# Patient Record
Sex: FEMALE | Race: ASIAN | URBAN - METROPOLITAN AREA
[De-identification: names, ages, dates, MRNs, and addresses within clinical notes are randomized per-mention and may not be internally consistent; named-entity substitution may affect disease eponyms.]

---

## 2023-11-18 ENCOUNTER — EMERGENCY (EMERGENCY)
Facility: HOSPITAL | Age: 78
LOS: 1 days | Discharge: HOME | End: 2023-11-18
Attending: STUDENT IN AN ORGANIZED HEALTH CARE EDUCATION/TRAINING PROGRAM | Admitting: STUDENT IN AN ORGANIZED HEALTH CARE EDUCATION/TRAINING PROGRAM
Payer: MEDICARE

## 2023-11-18 VITALS
RESPIRATION RATE: 17 BRPM | TEMPERATURE: 98 F | OXYGEN SATURATION: 98 % | DIASTOLIC BLOOD PRESSURE: 84 MMHG | HEART RATE: 81 BPM | SYSTOLIC BLOOD PRESSURE: 117 MMHG

## 2023-11-18 VITALS
SYSTOLIC BLOOD PRESSURE: 177 MMHG | OXYGEN SATURATION: 99 % | HEART RATE: 72 BPM | RESPIRATION RATE: 16 BRPM | DIASTOLIC BLOOD PRESSURE: 85 MMHG

## 2023-11-18 LAB
ALBUMIN SERPL ELPH-MCNC: 4.1 G/DL — SIGNIFICANT CHANGE UP (ref 3.3–5)
ALBUMIN SERPL ELPH-MCNC: 4.1 G/DL — SIGNIFICANT CHANGE UP (ref 3.3–5)
ALP SERPL-CCNC: 83 U/L — SIGNIFICANT CHANGE UP (ref 40–120)
ALP SERPL-CCNC: 83 U/L — SIGNIFICANT CHANGE UP (ref 40–120)
ALT FLD-CCNC: 18 U/L — SIGNIFICANT CHANGE UP (ref 4–33)
ALT FLD-CCNC: 18 U/L — SIGNIFICANT CHANGE UP (ref 4–33)
ANION GAP SERPL CALC-SCNC: 9 MMOL/L — SIGNIFICANT CHANGE UP (ref 7–14)
ANION GAP SERPL CALC-SCNC: 9 MMOL/L — SIGNIFICANT CHANGE UP (ref 7–14)
AST SERPL-CCNC: 19 U/L — SIGNIFICANT CHANGE UP (ref 4–32)
AST SERPL-CCNC: 19 U/L — SIGNIFICANT CHANGE UP (ref 4–32)
BASOPHILS # BLD AUTO: 0.03 K/UL — SIGNIFICANT CHANGE UP (ref 0–0.2)
BASOPHILS # BLD AUTO: 0.03 K/UL — SIGNIFICANT CHANGE UP (ref 0–0.2)
BASOPHILS NFR BLD AUTO: 0.6 % — SIGNIFICANT CHANGE UP (ref 0–2)
BASOPHILS NFR BLD AUTO: 0.6 % — SIGNIFICANT CHANGE UP (ref 0–2)
BILIRUB SERPL-MCNC: 0.2 MG/DL — SIGNIFICANT CHANGE UP (ref 0.2–1.2)
BILIRUB SERPL-MCNC: 0.2 MG/DL — SIGNIFICANT CHANGE UP (ref 0.2–1.2)
BUN SERPL-MCNC: 16 MG/DL — SIGNIFICANT CHANGE UP (ref 7–23)
BUN SERPL-MCNC: 16 MG/DL — SIGNIFICANT CHANGE UP (ref 7–23)
CALCIUM SERPL-MCNC: 9.3 MG/DL — SIGNIFICANT CHANGE UP (ref 8.4–10.5)
CALCIUM SERPL-MCNC: 9.3 MG/DL — SIGNIFICANT CHANGE UP (ref 8.4–10.5)
CHLORIDE SERPL-SCNC: 101 MMOL/L — SIGNIFICANT CHANGE UP (ref 98–107)
CHLORIDE SERPL-SCNC: 101 MMOL/L — SIGNIFICANT CHANGE UP (ref 98–107)
CO2 SERPL-SCNC: 29 MMOL/L — SIGNIFICANT CHANGE UP (ref 22–31)
CO2 SERPL-SCNC: 29 MMOL/L — SIGNIFICANT CHANGE UP (ref 22–31)
CREAT SERPL-MCNC: 0.57 MG/DL — SIGNIFICANT CHANGE UP (ref 0.5–1.3)
CREAT SERPL-MCNC: 0.57 MG/DL — SIGNIFICANT CHANGE UP (ref 0.5–1.3)
EGFR: 93 ML/MIN/1.73M2 — SIGNIFICANT CHANGE UP
EGFR: 93 ML/MIN/1.73M2 — SIGNIFICANT CHANGE UP
EOSINOPHIL # BLD AUTO: 0.07 K/UL — SIGNIFICANT CHANGE UP (ref 0–0.5)
EOSINOPHIL # BLD AUTO: 0.07 K/UL — SIGNIFICANT CHANGE UP (ref 0–0.5)
EOSINOPHIL NFR BLD AUTO: 1.5 % — SIGNIFICANT CHANGE UP (ref 0–6)
EOSINOPHIL NFR BLD AUTO: 1.5 % — SIGNIFICANT CHANGE UP (ref 0–6)
GLUCOSE SERPL-MCNC: 113 MG/DL — HIGH (ref 70–99)
GLUCOSE SERPL-MCNC: 113 MG/DL — HIGH (ref 70–99)
HCT VFR BLD CALC: 37.3 % — SIGNIFICANT CHANGE UP (ref 34.5–45)
HCT VFR BLD CALC: 37.3 % — SIGNIFICANT CHANGE UP (ref 34.5–45)
HGB BLD-MCNC: 12.2 G/DL — SIGNIFICANT CHANGE UP (ref 11.5–15.5)
HGB BLD-MCNC: 12.2 G/DL — SIGNIFICANT CHANGE UP (ref 11.5–15.5)
IANC: 2.11 K/UL — SIGNIFICANT CHANGE UP (ref 1.8–7.4)
IANC: 2.11 K/UL — SIGNIFICANT CHANGE UP (ref 1.8–7.4)
IMM GRANULOCYTES NFR BLD AUTO: 0.2 % — SIGNIFICANT CHANGE UP (ref 0–0.9)
IMM GRANULOCYTES NFR BLD AUTO: 0.2 % — SIGNIFICANT CHANGE UP (ref 0–0.9)
LYMPHOCYTES # BLD AUTO: 2.15 K/UL — SIGNIFICANT CHANGE UP (ref 1–3.3)
LYMPHOCYTES # BLD AUTO: 2.15 K/UL — SIGNIFICANT CHANGE UP (ref 1–3.3)
LYMPHOCYTES # BLD AUTO: 44.9 % — HIGH (ref 13–44)
LYMPHOCYTES # BLD AUTO: 44.9 % — HIGH (ref 13–44)
MAGNESIUM SERPL-MCNC: 2.3 MG/DL — SIGNIFICANT CHANGE UP (ref 1.6–2.6)
MAGNESIUM SERPL-MCNC: 2.3 MG/DL — SIGNIFICANT CHANGE UP (ref 1.6–2.6)
MCHC RBC-ENTMCNC: 30.7 PG — SIGNIFICANT CHANGE UP (ref 27–34)
MCHC RBC-ENTMCNC: 30.7 PG — SIGNIFICANT CHANGE UP (ref 27–34)
MCHC RBC-ENTMCNC: 32.7 GM/DL — SIGNIFICANT CHANGE UP (ref 32–36)
MCHC RBC-ENTMCNC: 32.7 GM/DL — SIGNIFICANT CHANGE UP (ref 32–36)
MCV RBC AUTO: 93.7 FL — SIGNIFICANT CHANGE UP (ref 80–100)
MCV RBC AUTO: 93.7 FL — SIGNIFICANT CHANGE UP (ref 80–100)
MONOCYTES # BLD AUTO: 0.42 K/UL — SIGNIFICANT CHANGE UP (ref 0–0.9)
MONOCYTES # BLD AUTO: 0.42 K/UL — SIGNIFICANT CHANGE UP (ref 0–0.9)
MONOCYTES NFR BLD AUTO: 8.8 % — SIGNIFICANT CHANGE UP (ref 2–14)
MONOCYTES NFR BLD AUTO: 8.8 % — SIGNIFICANT CHANGE UP (ref 2–14)
NEUTROPHILS # BLD AUTO: 2.11 K/UL — SIGNIFICANT CHANGE UP (ref 1.8–7.4)
NEUTROPHILS # BLD AUTO: 2.11 K/UL — SIGNIFICANT CHANGE UP (ref 1.8–7.4)
NEUTROPHILS NFR BLD AUTO: 44 % — SIGNIFICANT CHANGE UP (ref 43–77)
NEUTROPHILS NFR BLD AUTO: 44 % — SIGNIFICANT CHANGE UP (ref 43–77)
NRBC # BLD: 0 /100 WBCS — SIGNIFICANT CHANGE UP (ref 0–0)
NRBC # BLD: 0 /100 WBCS — SIGNIFICANT CHANGE UP (ref 0–0)
NRBC # FLD: 0 K/UL — SIGNIFICANT CHANGE UP (ref 0–0)
NRBC # FLD: 0 K/UL — SIGNIFICANT CHANGE UP (ref 0–0)
NT-PROBNP SERPL-SCNC: 432 PG/ML — HIGH
NT-PROBNP SERPL-SCNC: 432 PG/ML — HIGH
PHOSPHATE SERPL-MCNC: 4.2 MG/DL — SIGNIFICANT CHANGE UP (ref 2.5–4.5)
PHOSPHATE SERPL-MCNC: 4.2 MG/DL — SIGNIFICANT CHANGE UP (ref 2.5–4.5)
PLATELET # BLD AUTO: 293 K/UL — SIGNIFICANT CHANGE UP (ref 150–400)
PLATELET # BLD AUTO: 293 K/UL — SIGNIFICANT CHANGE UP (ref 150–400)
POTASSIUM SERPL-MCNC: 3.6 MMOL/L — SIGNIFICANT CHANGE UP (ref 3.5–5.3)
POTASSIUM SERPL-MCNC: 3.6 MMOL/L — SIGNIFICANT CHANGE UP (ref 3.5–5.3)
POTASSIUM SERPL-SCNC: 3.6 MMOL/L — SIGNIFICANT CHANGE UP (ref 3.5–5.3)
POTASSIUM SERPL-SCNC: 3.6 MMOL/L — SIGNIFICANT CHANGE UP (ref 3.5–5.3)
PROT SERPL-MCNC: 7 G/DL — SIGNIFICANT CHANGE UP (ref 6–8.3)
PROT SERPL-MCNC: 7 G/DL — SIGNIFICANT CHANGE UP (ref 6–8.3)
RBC # BLD: 3.98 M/UL — SIGNIFICANT CHANGE UP (ref 3.8–5.2)
RBC # BLD: 3.98 M/UL — SIGNIFICANT CHANGE UP (ref 3.8–5.2)
RBC # FLD: 12.6 % — SIGNIFICANT CHANGE UP (ref 10.3–14.5)
RBC # FLD: 12.6 % — SIGNIFICANT CHANGE UP (ref 10.3–14.5)
SODIUM SERPL-SCNC: 139 MMOL/L — SIGNIFICANT CHANGE UP (ref 135–145)
SODIUM SERPL-SCNC: 139 MMOL/L — SIGNIFICANT CHANGE UP (ref 135–145)
TROPONIN T, HIGH SENSITIVITY RESULT: 13 NG/L — SIGNIFICANT CHANGE UP
TROPONIN T, HIGH SENSITIVITY RESULT: 13 NG/L — SIGNIFICANT CHANGE UP
WBC # BLD: 4.79 K/UL — SIGNIFICANT CHANGE UP (ref 3.8–10.5)
WBC # BLD: 4.79 K/UL — SIGNIFICANT CHANGE UP (ref 3.8–10.5)
WBC # FLD AUTO: 4.79 K/UL — SIGNIFICANT CHANGE UP (ref 3.8–10.5)
WBC # FLD AUTO: 4.79 K/UL — SIGNIFICANT CHANGE UP (ref 3.8–10.5)

## 2023-11-18 PROCEDURE — 99285 EMERGENCY DEPT VISIT HI MDM: CPT

## 2023-11-18 PROCEDURE — 93010 ELECTROCARDIOGRAM REPORT: CPT

## 2023-11-18 PROCEDURE — 71046 X-RAY EXAM CHEST 2 VIEWS: CPT | Mod: 26

## 2023-11-18 NOTE — ED PROVIDER NOTE - NSFOLLOWUPINSTRUCTIONS_ED_ALL_ED_FT
It was a pleasure caring for you today!    You were seen in the ER today for fatigue and palpitations. Your chest x ray and ekg was negative.     Please follow up with a cardiologist and your primary care doctor within 1 - 3 days. Call and let them know you were seen in the ER today.   Bring the results of your blood work and imaging with you to your appointment, if applicable.    For pain, please take acetaminophen 650 mg every 6 hours for pain. Additionally, you can also take ibuprofen 400 mg every 6-8 hours for pain.    Return to the ER for any worsening symptoms or concerns, including chest pain, shortness of breath, lightheadedness, weakness, or any other concerns. It was a pleasure caring for you today!    You were seen in the ER today for fatigue and palpitations. Your chest x ray and ekg was negative. Your blood pressure is elevated.     Please follow up with a cardiologist and your primary care doctor within 1 - 3 days. Call and let them know you were seen in the ER today.   Bring the results of your blood work and imaging with you to your appointment, if applicable.    For pain, please take acetaminophen 650 mg every 6 hours for pain. Additionally, you can also take ibuprofen 400 mg every 6-8 hours for pain.    Return to the ER for any worsening symptoms or concerns, including chest pain, shortness of breath, lightheadedness, weakness, or any other concerns.

## 2023-11-18 NOTE — ED ADULT NURSE NOTE - NSFALLUNIVINTERV_ED_ALL_ED
Bed/Stretcher in lowest position, wheels locked, appropriate side rails in place/Call bell, personal items and telephone in reach/Instruct patient to call for assistance before getting out of bed/chair/stretcher/Non-slip footwear applied when patient is off stretcher/[place] to call system/Physically safe environment - no spills, clutter or unnecessary equipment/Purposeful proactive rounding/Room/bathroom lighting operational, light cord in reach

## 2023-11-18 NOTE — ED PROVIDER NOTE - ATTENDING CONTRIBUTION TO CARE
82 yo F with h/o HTN who presents to the ED, referred by urgent care for "EKG changes."  Patient states that the last 5 years she has had intermittent palpitations, many years ago had an echo and stress test with cardiologist which she reports was normal at that time.  Went to urgent care today due to the palpitations as well as feeling fatigued. Had a covid test which was negative but also had an EKG which they told here was abnormal. Pt recently moved here and the daughter reports has been more active with the move which is probably why she is fatigued. On exam, pt well appearing, heart and lungs sounds normal. She denies chest pain and shortness of breath. EKG with T wave inversions- no prior for comparison. In the process of setting up care with a cardiologist in [place]. Plan for  labs, chest x-ray, reassess. If work-up normal, most likely d/c home with outpatient work-up

## 2023-11-18 NOTE — ED PROVIDER NOTE - CLINICAL SUMMARY MEDICAL DECISION MAKING FREE TEXT BOX
78-year-old female with no significant past medical history presents to the ED with fatigue and palpitations.  Patient endorses fatigue and stress ever since moving into her home.  Patient endorses dyspnea on exertion when moving furniture.  Patient denies any chest pain, shortness of breath, N/V, belly pain.  Patient went to urgent care and was told that she had an abnormal EKG.  Patient was told to come to the emergency department.    Vitals WNL.  Mild swelling noted on lower extremities.  R/L ACS, arrhythmia, CHF.  Orders include EKG, labs, chest x ray.  Dispo pending labs, imaging and reassessment.

## 2023-11-18 NOTE — ED ADULT TRIAGE NOTE - CHIEF COMPLAINT QUOTE
[facility] palpations[facility] to [facility] an [facility]. pt currently judy chest pain. pt c/o palpations, went to [facility] for a covid test and found to have an abnormal ekg. pt currently judy chest pain.  addendum: pt up-triaged, EKG abnormal.

## 2023-11-18 NOTE — ED PROVIDER NOTE - PATIENT PORTAL LINK FT
You can access the FollowMyHealth Patient Portal offered by [facility] by registering at the following website: [email or web address]. By joining [employer]’s FollowMyHealth portal, you will also be able to view your health information using other applications (apps) compatible with our system.

## 2023-11-18 NOTE — ED ADULT NURSE NOTE - OBJECTIVE STATEMENT
Pt reports being sent in from urgent care due to palpitations and abnormal ekg. Pt Denies chest pain, sob, cough, nausea, vomiting, and diarrhea. Pt place on continuos cardiac monitor with sinus rhythm, labs drawn, IV established.

## 2023-11-18 NOTE — ED ADULT NURSE NOTE - CHIEF COMPLAINT QUOTE
pt c/o palpations, went to [facility] for a covid test and found to have an abnormal ekg. pt currently judy chest pain.  addendum: pt up-triaged, EKG abnormal.

## 2023-11-18 NOTE — ED PROVIDER NOTE - PROGRESS NOTE DETAILS
BRADEN Moore - 81-year-old woman with PMH HTN, presenting due to EKG changes at urgent care.  Patient states that the last 5 years she has had intermittent palpitations, many years ago had an echo and stress test with cardiologist which were nonactionable.  Went to urgent care today due to the palpitations as well as feeling fatigued, daughter believes it may be due to doing a lot of chores since she has recently moved from [place]. COVID negative. EKG shows T wave inversions. No chest pain, no SOB.  No palpitations on cardiac monitor.  Labs, chest x-ray, reassess. foreseeable discharge. Pt reassessed at bedside and informed of negative results. Vitals are wnl.

## 2023-11-18 NOTE — ED PROVIDER NOTE - OBJECTIVE STATEMENT
78-year-old female with no significant past medical history presents to the ED with fatigue and palpitations.  Patient endorses fatigue and stress ever since moving into her home.  Patient endorses dyspnea on exertion when moving furniture.  Patient denies any chest pain, shortness of breath, N/V, belly pain.  Patient went to urgent care and was told that she had an abnormal EKG.  Patient was told to come to the emergency department.

## 2023-11-18 NOTE — ED PROVIDER NOTE - PHYSICAL EXAMINATION
Const: Awake, alert, no acute distress.  Well appearing.  Moving comfortably on stretcher.  HEENT: NC/AT.  Moist mucous membranes.  No pharyngeal erythema, no exudates.  Cardiac: Regular rate and regular rhythm. S1 S2 present.  Peripheral pulses 2+ and symmetric. Mild bilateral 1+ pitting edema.   Resp: Speaking in full sentences. No evidence of respiratory distress.  Breath sounds clear to auscultation b/l. Normal chest excursion.   Abd: Non-distended, no overlying skin changes.  Soft, non-tender, no guarding, no rigidity, no rebound tenderness.  No palpable masses.  Normal bowel sounds in all 4 quadrants.  Back: Spine midline and non-tender. No CVAT.  Skin: Normal coloration.  No rashes, abrasions or lacerations.  Neuro: Awake, alert & oriented x 3.  Moves all extremities spontaneously.  No focal deficits.

## 2023-11-18 NOTE — ED PROVIDER NOTE - NSFOLLOWUPCLINICS_GEN_ALL_ED_FT
Cardiology at [facility]  Cardiology  [address] [place] [id number]  Phone: [phone]  Fax:   Follow Up Time: 1-3 Days    Cardiology at [facility]  Cardiology  [address] [place] [address]  Phone: [phone]  Fax:   Follow Up Time: 1-3 Days

## 2025-02-03 NOTE — ED ADULT NURSE NOTE - NS ED NURSE LEVEL OF CONSCIOUSNESS AFFECT
"Lyndsey Negin Anusha     VITALS: Blood pressure 138/80, pulse 82, temperature 97.5 °F (36.4 °C), temperature source Temporal, resp. rate 16, height 149.9 cm (59\"), weight 47.1 kg (103 lb 12.8 oz), SpO2 96%, not currently breastfeeding.    Subjective  Chief Complaint  Establish Care and Annual Exam    Subjective          History of Present Illness:    History of Present Illness  The patient is a 74-year-old female with medical conditions significant for insomnia and hypertension who presents to the clinic for establishment of care.    She has been experiencing decreased blood pressures compared to her usual at home, with readings typically around 120/70, despite discontinuing her antihypertensive medication. She has been on antihypertensive therapy for approximately one year, initially starting with a lisinopril 10 mg dose that was later increased to 20 mg. She has attempted to halve her 20 mg tablets but found them difficult to split. She has also been prescribed hydrochlorothiazide in the past but discontinued it due to dizziness. She has been monitoring her blood pressure daily and reports that it remains elevated during her visits to the oncologist, even while on medication. She has been under the care of a nephrologist due to proteinuria, which she attributes to her Avastin treatment. Her last few treatments were not administered due to severe proteinuria, but her condition has since improved. She has been advised to report any blood pressure readings below a certain threshold. She has been off treatment for nearly a year and consults with her oncologist every three months. She received her pneumococcal vaccine last year at [facility] and has completed her shingles vaccine series this year. She also received her COVID-19 and influenza vaccines this year.    She has a long-standing history of hypercholesterolemia, dating back approximately 50 years. She has not undergone any recent cholesterol checks and expresses " a desire to have her blood work done. She has been prescribed medications for her cholesterol in the past but did not like it.    She has been using Unisom for sleep, taking it nightly for one to two weeks before discontinuing it for three to five days. She has not tried melatonin. She reports frequent nighttime urination and suspects it may be related to her previous treatments. She also reports memory issues and wonders if they could be related to her treatments.    She has noticed a lump in her neck, which she does not believe is related to her previous salivary cyst surgery in 2020. She has been informed that the cyst was benign but had the potential to become malignant. She has not undergone a PET scan and reports that her oncologist occasionally checks her lymph nodes. She has never had her axillary lymph nodes examined.    She has experienced hair loss twice, with the most recent episode resulting in incomplete regrowth. She has been taking biotin and reports some new hair growth, although at a slow rate.    She has a history of varicose veins and reports mild neuropathy, which she believes is improving. She has been engaging in regular walking exercises and finds that working her toes provides some relief.    She had a mammogram in July 2024, which revealed a suspicious area. A biopsy was performed, and the results were normal. She is due for a follow-up mammogram in June 2025.    IMMUNIZATIONS  She received the pneumococcal pneumonia vaccine last year at [facility]. She received the COVID-19 vaccine and the influenza vaccine this year. She finished her shingles vaccine series this year.    No complaints regarding medications.     The following portions of the patient's history were reviewed and updated as appropriate: allergies, current medications, past family history, past medical history, past social history, past surgical history and problem list.    Past Medical History  Past Medical History:  "  Diagnosis Date    Hypertension     Kidney disease     Ovarian cancer 2018    chemotherapy     Renal insufficiency 02/2024    Wears glasses        Surgical History  Past Surgical History:   Procedure Laterality Date    APPENDECTOMY      possibly with tubal (not certain)    BREAST BIOPSY Bilateral 1972    cysts removed     COLONOSCOPY      EXPLORATORY LAPAROTOMY, TOTAL ABDOMINAL HYSTERECTOMY SALPINGO OOPHORECTOMY N/A 09/11/2018    Procedure: LAPAROTOMY EXPLORATORY, TOTAL ABDOMINAL HYSTERECTOMY, BILATERAL SALPINGO OOPHORECTOMY, DEBULKING;  Surgeon: Shannan Bess MD;  Location: [facility];  Service: Gynecology    HERNIA REPAIR Right     inguinal     SUBLINGUAL SALIVARY CYST EXCISION Right 2020    at [facility]    TUBAL ABDOMINAL LIGATION         Family History  Family History   Adopted: Yes   Family history unknown: Yes       Social History  Social History     Socioeconomic History    Marital status:    Tobacco Use    Smoking status: Never     Passive exposure: Never    Smokeless tobacco: Never   Vaping Use    Vaping status: Never Used   Substance and Sexual Activity    Alcohol use: No    Drug use: No    Sexual activity: Not Currently     Partners: Male       Objective   Vital Signs:   /80 (BP Location: Right arm, Patient Position: Sitting, Cuff Size: Adult)   Pulse 82   Temp 97.5 °F (36.4 °C) (Temporal)   Resp 16   Ht 149.9 cm (59\")   Wt 47.1 kg (103 lb 12.8 oz)   SpO2 96%   BMI 20.97 kg/m²       Physical Exam     Physical Exam  Lungs were auscultated.    Gen: Patient in NAD. Pleasant and answers appropriately. A&Ox3.  No lymphadenopathy appreciated bilaterally.    Skin: Warm and dry with normal turgor. No purpura, rashes, or unusual pigmentation noted. Hair is normal in appearance and distribution.    HEENT: NC/AT. No lesions noted. Conjunctiva clear, sclera nonicteric. PERRL. EOMI without nystagmus or strabismus. Fundi appear benign. No hemorrhages or exudates of eyes. Auditory canals are patent " bilaterally without lesions. TMs intact,  nonerythematous, bulging without lesions. Nasal mucosa pink, nonerythematous, and nonedematous. Frontal and maxillary sinuses are nontender. O/P erythematous and moist without exudate.    Neck: Supple without lymph nodes palpated. FROM. No carotid bruits appreciated bilaterally.    Lungs: CTA B/L without rales, rhonchi, crackles, or wheezes.    Heart: RRR. S1 and S2 normal. No S3 or S4. No MRGT.    Abd: Soft, nontender,nondistended. (+)BSx4 quadrants.     Extrem: No CC.  Trace edema bilateral lower extremities.  Radial pulses 2+/4 and equal B/L. FROMx4.  Positive joint tenderness noted.    Neuro: No focal motor/sensory deficits.    Procedures    Result Review :   The following data was reviewed by: Anais Melo MD on 01/13/2025:       Results  Laboratory Studies  CA-125 tumor marker was 24.7 in October 2024.           Assessment and Plan      Lyndsey Tavares is a 74 y.o. here for medical followup.    Diagnoses and all orders for this visit:    1. Essential hypertension (Primary)  -     CBC Auto Differential; Future  -     Cancel: Comprehensive Metabolic Panel; Future  -     TSH Rfx On Abnormal To Free T4; Future  -     lisinopril (PRINIVIL,ZESTRIL) 10 MG tablet; Take 1 tablet by mouth Daily.  Dispense: 90 tablet; Refill: 0    2. History of ovarian cancer  -     CBC Auto Differential; Future  -     Cancel: Comprehensive Metabolic Panel; Future  -     TSH Rfx On Abnormal To Free T4; Future  -     Magnesium; Future  -     Vitamin B12; Future    3. Proteinuria, unspecified type  -     CBC Auto Differential; Future  -     Cancel: Comprehensive Metabolic Panel; Future  -     TSH Rfx On Abnormal To Free T4; Future    4. Encounter for lipid screening for cardiovascular disease  -     Lipid Panel; Future    5. Vitamin D deficiency  -     Vitamin D,25-Hydroxy; Future    Other orders  -     LABS SCANNED        Assessment & Plan  1. Hypertension.  Her blood pressure readings  at home have been satisfactory, typically around 120/70 mmHg. She has been off her medication due to low readings. A prescription for lisinopril 10 mg will be provided to manage her blood pressure. She is advised to take the medication before doctor's appointments to prevent elevated readings due to anxiety.    2.  Encounter to check lipids.    She has a long history of high cholesterol, approximately 50 years. A comprehensive lipid panel will be ordered to assess her current cholesterol levels. The cardiac risk score will be evaluated to determine the need for any medication adjustments.    3. Insomnia.  She currently uses Unisom intermittently for sleep. She is advised to try melatonin, starting with 3 mg or 5 mg, and increasing up to 10 mg if necessary. If 10 mg is ineffective, she should inform the provider to consider alternative treatments.    4. Proteinuria.  She has a history of proteinuria, likely exacerbated by Avastin treatment. She is scheduled to see her nephrologist next week for a urine test. She is advised to follow up with her nephrologist as scheduled and to consider dietary modifications to support kidney health. Educational materials on kidney-friendly diets will be provided.    5. Ovarian Cancer.  Her CA-125 levels have been stable but showed a slight increase in October. She is advised to continue regular follow-ups with her oncologist. She is also advised to get her labs done locally before her appointments in [place] to ensure timely results.    6. Memory Concerns.  She reports memory issues, which may be related to aging or previous treatments. A memory test will be conducted today to assess her cognitive function.    7. Neuropathy.  She reports mild neuropathy, likely a side effect of previous treatments. She is advised to continue regular physical activity, such as walking, to manage symptoms.    8. Health Maintenance.  She received her pneumococcal vaccine last year and has completed  her shingles vaccine series this year. She is advised to schedule her next mammogram, as her last one was in July. A right ultrasound is also recommended.    PROCEDURE  The patient underwent neck surgery in 2020 to remove a salivary cyst.      BMI is within normal parameters. No other follow-up for BMI required.       Patient or patient representative verbalized consent for the use of Ambient Listening during the visit with  Anais Melo MD for chart documentation. 2/2/2025  22:07 EST        Follow Up   Return in about 3 months (around 4/13/2025), or will [facility] for breast biopsy report - mammogram done 7/30/24 we have).  Findings and plans discussed with patient who verbalizes understanding and agreement. Will followup with patient once results are in. Patient was given instructions and counseling regarding her condition or for health maintenance advice. Please see specific information pulled into the AVS if appropriate.       Anais Melo MD         Calm

## 2025-04-01 PROBLEM — Z00.00 ENCOUNTER FOR PREVENTIVE HEALTH EXAMINATION: Status: ACTIVE | Noted: 2025-04-01

## 2025-04-28 ENCOUNTER — APPOINTMENT (OUTPATIENT)
Dept: CARDIOLOGY | Facility: CLINIC | Age: 80
End: 2025-04-28
Payer: MEDICARE

## 2025-04-28 VITALS
WEIGHT: 134 LBS | RESPIRATION RATE: 16 BRPM | DIASTOLIC BLOOD PRESSURE: 82 MMHG | BODY MASS INDEX: 24.66 KG/M2 | HEIGHT: 62 IN | SYSTOLIC BLOOD PRESSURE: 169 MMHG | OXYGEN SATURATION: 96 % | HEART RATE: 87 BPM

## 2025-04-28 DIAGNOSIS — R06.00 DYSPNEA, UNSPECIFIED: ICD-10-CM

## 2025-04-28 PROCEDURE — 93000 ELECTROCARDIOGRAM COMPLETE: CPT

## 2025-04-28 PROCEDURE — G2211 COMPLEX E/M VISIT ADD ON: CPT

## 2025-04-28 PROCEDURE — 99204 OFFICE O/P NEW MOD 45 MIN: CPT

## 2025-06-09 ENCOUNTER — APPOINTMENT (OUTPATIENT)
Dept: CARDIOLOGY | Facility: CLINIC | Age: 80
End: 2025-06-09
Payer: MEDICARE

## 2025-06-09 VITALS — DIASTOLIC BLOOD PRESSURE: 81 MMHG | SYSTOLIC BLOOD PRESSURE: 172 MMHG

## 2025-06-09 PROCEDURE — 99215 OFFICE O/P EST HI 40 MIN: CPT

## 2025-06-09 PROCEDURE — 93306 TTE W/DOPPLER COMPLETE: CPT
